# Patient Record
Sex: MALE | Race: WHITE | NOT HISPANIC OR LATINO | Employment: FULL TIME | URBAN - METROPOLITAN AREA
[De-identification: names, ages, dates, MRNs, and addresses within clinical notes are randomized per-mention and may not be internally consistent; named-entity substitution may affect disease eponyms.]

---

## 2023-06-08 ENCOUNTER — OFFICE VISIT (OUTPATIENT)
Dept: PRIMARY CARE | Facility: CLINIC | Age: 28
End: 2023-06-08
Payer: COMMERCIAL

## 2023-06-08 VITALS
DIASTOLIC BLOOD PRESSURE: 80 MMHG | WEIGHT: 179 LBS | BODY MASS INDEX: 23.62 KG/M2 | SYSTOLIC BLOOD PRESSURE: 133 MMHG | HEART RATE: 66 BPM

## 2023-06-08 DIAGNOSIS — J01.40 ACUTE NON-RECURRENT PANSINUSITIS: Primary | ICD-10-CM

## 2023-06-08 PROBLEM — M54.9 BACK PAIN: Status: ACTIVE | Noted: 2023-06-08

## 2023-06-08 PROBLEM — M43.10 SPONDYLOLISTHESIS, ACQUIRED: Status: ACTIVE | Noted: 2023-06-08

## 2023-06-08 PROBLEM — R30.0 DYSURIA: Status: RESOLVED | Noted: 2023-06-08 | Resolved: 2023-06-08

## 2023-06-08 PROCEDURE — 1036F TOBACCO NON-USER: CPT | Performed by: NURSE PRACTITIONER

## 2023-06-08 PROCEDURE — 99213 OFFICE O/P EST LOW 20 MIN: CPT | Performed by: NURSE PRACTITIONER

## 2023-06-08 RX ORDER — METHYLPREDNISOLONE 4 MG/1
TABLET ORAL
Qty: 21 TABLET | Refills: 0 | Status: SHIPPED | OUTPATIENT
Start: 2023-06-08 | End: 2023-06-15

## 2023-06-08 RX ORDER — BENZONATATE 200 MG/1
200 CAPSULE ORAL 3 TIMES DAILY PRN
Qty: 42 CAPSULE | Refills: 0 | Status: SHIPPED | OUTPATIENT
Start: 2023-06-08 | End: 2023-07-08

## 2023-06-08 ASSESSMENT — ENCOUNTER SYMPTOMS: COUGH: 1

## 2023-06-08 NOTE — PROGRESS NOTES
Subjective   Patient ID: Gopi Wang is a 27 y.o. male who presents for Cough (Gopi is here today with c/o headache, cough, chest congestion x7days. Pt went to  on 6/3/23 and pt was given Augmentin x7 days which patient didn't even start until 6/6/23. /Pt also taking OTC medications to help with his symptoms. ).    Cough, nasal congestion, headache, and sinus pressure x 1 week - went to  on 6/3 and given augmentin.  Started Augmentin on 6/6  Also been taking dayquil, nyquil, and mucinex and only mild improvement   Denies fever, n/v/d.      Cough  This is a new problem. The current episode started 1 to 4 weeks ago. The problem has been unchanged.        Review of Systems   Respiratory:  Positive for cough.    All other systems reviewed and are negative.      Objective   /80   Pulse 66   Wt 81.2 kg (179 lb)   BMI 23.62 kg/m²     Physical Exam  Vitals reviewed.   Constitutional:       Appearance: He is normal weight.   HENT:      Head: Normocephalic and atraumatic.      Right Ear: Tympanic membrane, ear canal and external ear normal.      Left Ear: Tympanic membrane, ear canal and external ear normal.      Ears:      Comments: Left ear canal red     Nose:      Right Sinus: Maxillary sinus tenderness and frontal sinus tenderness present.      Left Sinus: Maxillary sinus tenderness and frontal sinus tenderness present.      Mouth/Throat:      Mouth: Mucous membranes are moist.      Pharynx: Oropharynx is clear.   Eyes:      Extraocular Movements: Extraocular movements intact.      Conjunctiva/sclera: Conjunctivae normal.      Pupils: Pupils are equal, round, and reactive to light.   Neck:      Vascular: No carotid bruit.   Cardiovascular:      Rate and Rhythm: Normal rate and regular rhythm.      Pulses: Normal pulses.      Heart sounds: Normal heart sounds.   Pulmonary:      Effort: Pulmonary effort is normal.      Breath sounds: Normal breath sounds.   Musculoskeletal:      Cervical back: Normal range of  motion.   Lymphadenopathy:      Cervical: No cervical adenopathy.   Skin:     General: Skin is warm and dry.   Neurological:      General: No focal deficit present.      Mental Status: He is alert and oriented to person, place, and time.   Psychiatric:         Mood and Affect: Mood normal.         Behavior: Behavior normal.         Thought Content: Thought content normal.         Judgment: Judgment normal.         Assessment/Plan   Problem List Items Addressed This Visit          Infectious/Inflammatory    Acute non-recurrent pansinusitis - Primary     - complete augmentin twice a day as prior prescribed  - medrol dose pack  - benzonatate   -  Nasal saline, increase fluids  -  hand hygiene and infection prevention discussed  -  Warm compress to sinuses  - humidification  - recommend to eat yogurt twice daily while taking antibiotic or a daily probiotic         Relevant Medications    benzonatate (Tessalon) 200 mg capsule    methylPREDNISolone (Medrol Dospak) 4 mg tablets

## 2023-06-08 NOTE — PATIENT INSTRUCTIONS
Continue the antibiotics until they are all gone  Increase your fluids  Take the steroids to help with your ear  I sent in cough medication to help as well

## 2023-06-08 NOTE — ASSESSMENT & PLAN NOTE
- complete augmentin twice a day as prior prescribed  - medrol dose pack  - benzonatate   -  Nasal saline, increase fluids  -  hand hygiene and infection prevention discussed  -  Warm compress to sinuses  - humidification  - recommend to eat yogurt twice daily while taking antibiotic or a daily probiotic

## 2023-10-25 ENCOUNTER — APPOINTMENT (OUTPATIENT)
Dept: PRIMARY CARE | Facility: CLINIC | Age: 28
End: 2023-10-25
Payer: COMMERCIAL

## 2023-12-04 ENCOUNTER — APPOINTMENT (OUTPATIENT)
Dept: PRIMARY CARE | Facility: CLINIC | Age: 28
End: 2023-12-04
Payer: COMMERCIAL

## 2023-12-13 ENCOUNTER — OFFICE VISIT (OUTPATIENT)
Dept: PRIMARY CARE | Facility: CLINIC | Age: 28
End: 2023-12-13
Payer: COMMERCIAL

## 2023-12-13 VITALS
DIASTOLIC BLOOD PRESSURE: 75 MMHG | SYSTOLIC BLOOD PRESSURE: 144 MMHG | OXYGEN SATURATION: 98 % | BODY MASS INDEX: 23.14 KG/M2 | HEART RATE: 58 BPM | HEIGHT: 73 IN | WEIGHT: 174.6 LBS

## 2023-12-13 DIAGNOSIS — Z13.220 LIPID SCREENING: ICD-10-CM

## 2023-12-13 DIAGNOSIS — I15.9 SECONDARY HYPERTENSION: ICD-10-CM

## 2023-12-13 DIAGNOSIS — R19.5 LOOSE STOOLS: ICD-10-CM

## 2023-12-13 DIAGNOSIS — B97.7 HPV IN MALE: ICD-10-CM

## 2023-12-13 PROBLEM — J01.40 ACUTE NON-RECURRENT PANSINUSITIS: Status: RESOLVED | Noted: 2023-06-08 | Resolved: 2023-12-13

## 2023-12-13 PROCEDURE — 99395 PREV VISIT EST AGE 18-39: CPT | Performed by: NURSE PRACTITIONER

## 2023-12-13 PROCEDURE — 1036F TOBACCO NON-USER: CPT | Performed by: NURSE PRACTITIONER

## 2023-12-13 PROCEDURE — 3077F SYST BP >= 140 MM HG: CPT | Performed by: NURSE PRACTITIONER

## 2023-12-13 PROCEDURE — 3078F DIAST BP <80 MM HG: CPT | Performed by: NURSE PRACTITIONER

## 2023-12-13 ASSESSMENT — PATIENT HEALTH QUESTIONNAIRE - PHQ9
1. LITTLE INTEREST OR PLEASURE IN DOING THINGS: NOT AT ALL
SUM OF ALL RESPONSES TO PHQ9 QUESTIONS 1 AND 2: 0
2. FEELING DOWN, DEPRESSED OR HOPELESS: NOT AT ALL

## 2023-12-13 NOTE — PROGRESS NOTES
"Subjective   Patient ID: Gopi Wang is a 28 y.o. male who presents for Annual Exam (Patient is here today for his annual exam, no complaints ).    28-year-old male here for annual.  There are a couple things he wanted to discuss today  HPV was DX this year. Saw derm.  He is concerned for cancer related to HPV.  He understands there is no monitoring for this.  He admits that it makes him nervous.  Open discussion with his wife they have been  8 years with 1 child who is 1-year-old.  GI- recent change in stools- on and off change in consistency.  Occasionally will have loose stools.  The change in bowels has been going on for at least 1 year  Fam hx Breast ca- has  LT discomfort with small lumps LT breast tissue, asking if I will assess  No nicotene; no vape  ETOH- 1-2 a night  Vision q yr  Dentist q 6 mo  No issue with hearing  Works full-time high stress job         Review of Systems   All other systems reviewed and are negative.      Objective   /75   Pulse 58   Ht 1.854 m (6' 1\")   Wt 79.2 kg (174 lb 9.6 oz)   SpO2 98%   BMI 23.04 kg/m²     Physical Exam  Vitals and nursing note reviewed.   Constitutional:       General: He is not in acute distress.     Appearance: Normal appearance.   HENT:      Head: Normocephalic and atraumatic.      Right Ear: Tympanic membrane and ear canal normal.      Left Ear: Tympanic membrane and ear canal normal.      Ears:      Comments: Minimal amount of cerumen bilateral ear canals     Nose: Nose normal.      Mouth/Throat:      Mouth: Mucous membranes are moist.      Pharynx: Oropharynx is clear.      Comments: No oral ulcers  Eyes:      Extraocular Movements: Extraocular movements intact.      Conjunctiva/sclera: Conjunctivae normal.      Pupils: Pupils are equal, round, and reactive to light.   Neck:      Vascular: No carotid bruit.      Comments: Thyroid palpable soft nontender no nodules and no cervical lymphadenopathy  Cardiovascular:      Rate and Rhythm: " "Normal rate and regular rhythm.      Pulses: Normal pulses.      Heart sounds: Normal heart sounds.   Pulmonary:      Effort: Pulmonary effort is normal.      Breath sounds: Normal breath sounds.   Abdominal:      General: Bowel sounds are normal. There is no distension.      Palpations: Abdomen is soft.      Tenderness: There is no abdominal tenderness.   Musculoskeletal:         General: Normal range of motion.      Cervical back: Normal range of motion.      Right lower leg: No edema.      Left lower leg: No edema.      Comments: Breast exam complete no nodules palpated no lymph nodes.  I cannot feel any of the \"lumps \"left breast that the patient was concerned with.  I palpated it between every rib cage including the axilla and breast tissue   Lymphadenopathy:      Cervical: No cervical adenopathy.   Skin:     General: Skin is warm and dry.      Capillary Refill: Capillary refill takes less than 2 seconds.   Neurological:      General: No focal deficit present.      Mental Status: He is alert and oriented to person, place, and time. Mental status is at baseline.   Psychiatric:         Mood and Affect: Mood normal.         Behavior: Behavior normal.         Thought Content: Thought content normal.         Judgment: Judgment normal.         Assessment/Plan   Diagnoses and all orders for this visit:  Loose stools  Comments:  Unknown cause could be dietary could be stress related.  Check TSH and LFT  Orders:  -     Thyroid Stimulating Hormone; Future  -     Thyroxine, Free; Future  HPV in male  Comments:  Needs to make sure to see the dentist every year monitor for new lesions update CBC with differential  Orders:  -     CBC and Auto Differential; Future  Lipid screening  Comments:  Update fasting lipid panel  Orders:  -     Lipid Panel; Future  Secondary hypertension  Comments:  Continue to monitor may be stress related.  Increase activity and exercise decrease alcohol.  Healthy low-fat low-sodium diet  Orders:  -  "    Comprehensive Metabolic Panel; Future  Other orders  -     Follow Up In Primary Care - Other; Future

## 2023-12-13 NOTE — PATIENT INSTRUCTIONS
Add Magnesium Glycinate - [facility] 1 tab am and pm  Vitamin D 3- 5,000 units a day with food.

## 2023-12-14 ENCOUNTER — LAB (OUTPATIENT)
Dept: LAB | Facility: LAB | Age: 28
End: 2023-12-14
Payer: COMMERCIAL

## 2023-12-14 DIAGNOSIS — I15.9 SECONDARY HYPERTENSION: ICD-10-CM

## 2023-12-14 DIAGNOSIS — B97.7 HPV IN MALE: ICD-10-CM

## 2023-12-14 DIAGNOSIS — Z13.220 LIPID SCREENING: ICD-10-CM

## 2023-12-14 DIAGNOSIS — R19.5 LOOSE STOOLS: ICD-10-CM

## 2023-12-14 LAB
ALBUMIN SERPL BCP-MCNC: 4.5 G/DL (ref 3.4–5)
ALP SERPL-CCNC: 38 U/L (ref 33–120)
ALT SERPL W P-5'-P-CCNC: 20 U/L (ref 10–52)
ANION GAP SERPL CALC-SCNC: 9 MMOL/L (ref 10–20)
AST SERPL W P-5'-P-CCNC: 16 U/L (ref 9–39)
BASOPHILS # BLD AUTO: 0.06 X10*3/UL (ref 0–0.1)
BASOPHILS NFR BLD AUTO: 1 %
BILIRUB SERPL-MCNC: 0.6 MG/DL (ref 0–1.2)
BUN SERPL-MCNC: 12 MG/DL (ref 6–23)
CALCIUM SERPL-MCNC: 9.6 MG/DL (ref 8.6–10.6)
CHLORIDE SERPL-SCNC: 103 MMOL/L (ref 98–107)
CHOLEST SERPL-MCNC: 136 MG/DL (ref 0–199)
CHOLESTEROL/HDL RATIO: 2.7
CO2 SERPL-SCNC: 33 MMOL/L (ref 21–32)
CREAT SERPL-MCNC: 1.12 MG/DL (ref 0.5–1.3)
EOSINOPHIL # BLD AUTO: 0.18 X10*3/UL (ref 0–0.7)
EOSINOPHIL NFR BLD AUTO: 3 %
ERYTHROCYTE [DISTWIDTH] IN BLOOD BY AUTOMATED COUNT: 11.9 % (ref 11.5–14.5)
GFR SERPL CREATININE-BSD FRML MDRD: >90 ML/MIN/1.73M*2
GLUCOSE SERPL-MCNC: 77 MG/DL (ref 74–99)
HCT VFR BLD AUTO: 45 % (ref 41–52)
HDLC SERPL-MCNC: 49.5 MG/DL
HGB BLD-MCNC: 15.4 G/DL (ref 13.5–17.5)
IMM GRANULOCYTES # BLD AUTO: 0.02 X10*3/UL (ref 0–0.7)
IMM GRANULOCYTES NFR BLD AUTO: 0.3 % (ref 0–0.9)
LDLC SERPL CALC-MCNC: 71 MG/DL
LYMPHOCYTES # BLD AUTO: 1.38 X10*3/UL (ref 1.2–4.8)
LYMPHOCYTES NFR BLD AUTO: 22.8 %
MCH RBC QN AUTO: 31.2 PG (ref 26–34)
MCHC RBC AUTO-ENTMCNC: 34.2 G/DL (ref 32–36)
MCV RBC AUTO: 91 FL (ref 80–100)
MONOCYTES # BLD AUTO: 0.53 X10*3/UL (ref 0.1–1)
MONOCYTES NFR BLD AUTO: 8.8 %
NEUTROPHILS # BLD AUTO: 3.88 X10*3/UL (ref 1.2–7.7)
NEUTROPHILS NFR BLD AUTO: 64.1 %
NON HDL CHOLESTEROL: 87 MG/DL (ref 0–149)
NRBC BLD-RTO: 0 /100 WBCS (ref 0–0)
PLATELET # BLD AUTO: 165 X10*3/UL (ref 150–450)
POTASSIUM SERPL-SCNC: 4.2 MMOL/L (ref 3.5–5.3)
PROT SERPL-MCNC: 7 G/DL (ref 6.4–8.2)
RBC # BLD AUTO: 4.94 X10*6/UL (ref 4.5–5.9)
SODIUM SERPL-SCNC: 141 MMOL/L (ref 136–145)
T4 FREE SERPL-MCNC: 0.9 NG/DL (ref 0.78–1.48)
TRIGL SERPL-MCNC: 80 MG/DL (ref 0–149)
TSH SERPL-ACNC: 0.94 MIU/L (ref 0.44–3.98)
VLDL: 16 MG/DL (ref 0–40)
WBC # BLD AUTO: 6.1 X10*3/UL (ref 4.4–11.3)

## 2023-12-14 PROCEDURE — 85025 COMPLETE CBC W/AUTO DIFF WBC: CPT | Performed by: NURSE PRACTITIONER

## 2023-12-14 PROCEDURE — 84439 ASSAY OF FREE THYROXINE: CPT | Performed by: NURSE PRACTITIONER

## 2023-12-14 PROCEDURE — 84443 ASSAY THYROID STIM HORMONE: CPT | Performed by: NURSE PRACTITIONER

## 2023-12-14 PROCEDURE — 80053 COMPREHEN METABOLIC PANEL: CPT | Performed by: NURSE PRACTITIONER

## 2023-12-14 PROCEDURE — 80061 LIPID PANEL: CPT | Performed by: NURSE PRACTITIONER

## 2024-04-15 ENCOUNTER — OFFICE VISIT (OUTPATIENT)
Dept: UROLOGY | Facility: HOSPITAL | Age: 29
End: 2024-04-15
Payer: COMMERCIAL

## 2024-04-15 ENCOUNTER — LAB (OUTPATIENT)
Dept: LAB | Facility: LAB | Age: 29
End: 2024-04-15
Payer: COMMERCIAL

## 2024-04-15 DIAGNOSIS — R39.15 URINARY URGENCY: ICD-10-CM

## 2024-04-15 DIAGNOSIS — R35.0 URINARY FREQUENCY: ICD-10-CM

## 2024-04-15 DIAGNOSIS — R35.0 URINARY FREQUENCY: Primary | ICD-10-CM

## 2024-04-15 LAB
CREAT SERPL-MCNC: 1.01 MG/DL (ref 0.5–1.3)
EGFRCR SERPLBLD CKD-EPI 2021: >90 ML/MIN/1.73M*2
PSA SERPL-MCNC: 3.23 NG/ML

## 2024-04-15 PROCEDURE — 84153 ASSAY OF PSA TOTAL: CPT

## 2024-04-15 PROCEDURE — 36415 COLL VENOUS BLD VENIPUNCTURE: CPT

## 2024-04-15 PROCEDURE — 99214 OFFICE O/P EST MOD 30 MIN: CPT | Performed by: UROLOGY

## 2024-04-15 PROCEDURE — 99204 OFFICE O/P NEW MOD 45 MIN: CPT | Performed by: UROLOGY

## 2024-04-15 PROCEDURE — 82565 ASSAY OF CREATININE: CPT

## 2024-04-15 NOTE — PROGRESS NOTES
NPV self referred:  -Increased urinary urgency  -dysuria dx from [facility]  -no new meds  -no time difference, happening all the time  -no psa level on file (thinks dad has prostate hx)  - X10 years, hx HPV dx within last 1 year

## 2024-04-15 NOTE — PROGRESS NOTES
HPI  28 y.o.  M presents today for complaints of LUTS.    -reports increased urinary urgency/ frequency - high volume voids each michelle  -drinks mostly water, some alcoholic beverages  -denies gross hematuria  -dysuria dx from [facility]  -no new meds  -no psa level on file (thinks dad has prostate hx)  - X10 years, hx HPV dx within last 1 year  -denies hx of pelvic trauma, gross hematuria, hx of STDs     Component      Latest Ref Rn 12/14/2023   LEUKOCYTES (10*3/UL) IN BLOOD BY AUTOMATED COUNT, [facility]      4.4 - 11.3 x10*3/uL 6.1    nRBC      0.0 - 0.0 /100 WBCs 0.0    ERYTHROCYTES (10*6/UL) IN BLOOD BY AUTOMATED COUNT, [facility]      4.50 - 5.90 x10*6/uL 4.94    HEMOGLOBIN      13.5 - 17.5 g/dL 15.4    HEMATOCRIT      41.0 - 52.0 % 45.0    MCV      80 - 100 fL 91    MCH      26.0 - 34.0 pg 31.2    MCHC      32.0 - 36.0 g/dL 34.2    RED CELL DISTRIBUTION WIDTH      11.5 - 14.5 % 11.9    PLATELETS (10*3/UL) IN BLOOD AUTOMATED COUNT, [facility]      150 - 450 x10*3/uL 165    NEUTROPHILS/100 LEUKOCYTES IN BLOOD BY AUTOMATED COUNT, [facility]      40.0 - 80.0 % 64.1    Immature Granulocytes %, Automated      0.0 - 0.9 % 0.3    Lymphocytes %      13.0 - 44.0 % 22.8    Monocytes %      2.0 - 10.0 % 8.8    Eosinophils %      0.0 - 6.0 % 3.0    Basophils %      0.0 - 2.0 % 1.0    NEUTROPHILS (10*3/UL) IN BLOOD BY AUTOMATED COUNT, [facility]      1.20 - 7.70 x10*3/uL 3.88    Immature Granulocytes Absolute, Automated      0.00 - 0.70 x10*3/uL 0.02    Lymphocytes Absolute      1.20 - 4.80 x10*3/uL 1.38    Monocytes Absolute      0.10 - 1.00 x10*3/uL 0.53    Eosinophils Absolute      0.00 - 0.70 x10*3/uL 0.18    Basophils Absolute      0.00 - 0.10 x10*3/uL 0.06    GLUCOSE      74 - 99 mg/dL 77    SODIUM      136 - 145 mmol/L 141    POTASSIUM      3.5 - 5.3 mmol/L 4.2    CHLORIDE      98 - 107 mmol/L 103    Bicarbonate      21 - 32 mmol/L 33 (H)    Anion Gap      10 - 20 mmol/L 9 (L)    Blood Urea Nitrogen      6 - 23 mg/dL 12     Creatinine      0.50 - 1.30 mg/dL 1.12    EGFR      >60 mL/min/1.73m*2 >90    Calcium      8.6 - 10.6 mg/dL 9.6    Albumin      3.4 - 5.0 g/dL 4.5    Alkaline Phosphatase      33 - 120 U/L 38    Total Protein      6.4 - 8.2 g/dL 7.0    AST      9 - 39 U/L 16    Bilirubin Total      0.0 - 1.2 mg/dL 0.6    ALT      10 - 52 U/L 20        Current Medications:  No current outpatient medications on file.     No current facility-administered medications for this visit.        PMH:  Past Medical History:   Diagnosis Date    Dysuria 06/08/2023    Personal history of (healed) stress fracture     Personal history of stress fracture    Spondylolisthesis, site unspecified     Spondylolisthesis       PSH:  Past Surgical History:   Procedure Laterality Date    OTHER SURGICAL HISTORY  07/17/2013    Dental Surgery    TONSILLECTOMY  07/17/2013    Tonsillectomy       FMH:  No family history on file.    SHx:  Social History     Tobacco Use    Smoking status: Never     Passive exposure: Never    Smokeless tobacco: Never   Vaping Use    Vaping status: Never Used   Substance Use Topics    Alcohol use: Yes     Comment: social    Drug use: Never       Allergies:  Allergies   Allergen Reactions    Sulfa (Sulfonamide Antibiotics) Unknown       Physical Exam   Testicles descended bilaterally, non-tender  Grade 2 varicoceles, bilaterally  Vasa palpable bilaterally  Penis circ'd, no lesions, no plaques    Assessment/Plan  #LUTS   We discussed the different causes for urinary difficulties as patients age, specifically BPH, bladder overactivity, and even stricture disease. We discussed different medications that could help him with his symptoms, including alpha blockers and finasteride, as well as surgical options such as TURP and Urolift.   -discussed dietary changes including restricting beverages with alcohol, caffeine and/or carbonation  -urinalysis/culture/PVR today   -Cr  -PSA  -cystoscopy in office. Discussed risks, including discomfort  bleeding, infection, damage to adjacent structures, need for further procedures, recurrence, etc     Follow up for cysto    Scribe Attestation  By signing my name below, I, Kacy Canseco, attest that this documentation  has been prepared under the direction and in the presence of Margarita Min MD.

## 2024-06-03 ENCOUNTER — PROCEDURE VISIT (OUTPATIENT)
Dept: UROLOGY | Facility: HOSPITAL | Age: 29
End: 2024-06-03
Payer: COMMERCIAL

## 2024-06-03 DIAGNOSIS — R39.9 LOWER URINARY TRACT SYMPTOMS (LUTS): ICD-10-CM

## 2024-06-03 DIAGNOSIS — R30.0 DYSURIA: ICD-10-CM

## 2024-06-03 DIAGNOSIS — R35.0 URINARY FREQUENCY: Primary | ICD-10-CM

## 2024-06-03 DIAGNOSIS — Z09 ENCOUNTER FOR FOLLOW-UP: ICD-10-CM

## 2024-06-03 DIAGNOSIS — R97.20 ELEVATED PSA: ICD-10-CM

## 2024-06-03 DIAGNOSIS — R39.15 URINARY URGENCY: ICD-10-CM

## 2024-06-03 PROCEDURE — 99213 OFFICE O/P EST LOW 20 MIN: CPT | Performed by: UROLOGY

## 2024-06-03 PROCEDURE — 87086 URINE CULTURE/COLONY COUNT: CPT | Mod: AHULAB | Performed by: UROLOGY

## 2024-06-03 PROCEDURE — 52000 CYSTOURETHROSCOPY: CPT | Performed by: UROLOGY

## 2024-06-03 NOTE — PROGRESS NOTES
HPI  28 y.o.  M presents today for complaints of LUTS.    Last Visit 4/15/24:  -discussed dietary changes including restricting beverages with alcohol, caffeine and/or carbonation  -urinalysis/culture/PVR today   -Cr  -PSA  -cystoscopy in office    Todays Visit:  #LUTS  -UA/CX not completed at last visit  - dip negative today  -had cysto today (see separate note)  -no hematuria  -reviewed PSA  -      -reports increased urinary urgency/ frequency - high volume voids each michelle  -drinks mostly water, some alcoholic beverages  -denies gross hematuria  -dysuria dx from [facility]  -no new meds  -no psa level on file (thinks dad has prostate hx)  - X10 years, hx HPV dx within last 1 year  -denies hx of pelvic trauma, gross hematuria, hx of STDs      Creatinine   Date Value Ref Range Status   04/15/2024 1.01 0.50 - 1.30 mg/dL Final     Prostate Specific AG   Date Value Ref Range Status   04/15/2024 3.23 <=4.00 ng/mL Final        Current Medications:  No current outpatient medications on file.     No current facility-administered medications for this visit.        PMH:  Past Medical History:   Diagnosis Date    Dysuria 06/08/2023    Personal history of (healed) stress fracture     Personal history of stress fracture    Spondylolisthesis, site unspecified     Spondylolisthesis       PSH:  Past Surgical History:   Procedure Laterality Date    OTHER SURGICAL HISTORY  07/17/2013    Dental Surgery    TONSILLECTOMY  07/17/2013    Tonsillectomy       FMH:  No family history on file.    SHx:  Social History     Tobacco Use    Smoking status: Never     Passive exposure: Never    Smokeless tobacco: Never   Vaping Use    Vaping status: Never Used   Substance Use Topics    Alcohol use: Yes     Comment: social    Drug use: Never       Allergies:  Allergies   Allergen Reactions    Sulfa (Sulfonamide Antibiotics) Unknown       Physical Exam   Testicles descended bilaterally, non-tender  Grade 2 varicoceles, bilaterally  Vasa  palpable bilaterally  Penis circ'd, no lesions, no plaques    Assessment/Plan  #LUTS: cysto wnl today  -discussed dietary changes including restricting beverages with alcohol, caffeine and/or carbonation  -urinalysis/culture    #elevated psa: 3, high for his age; discussed risk of prostate cancer. Even  -psa  -mri prostate    Fu with my oncology partner    Scribe Attestation  By signing my name below, I, Kacy Canseco, attest that this documentation  has been prepared under the direction and in the presence of Margarita Min MD.

## 2024-06-03 NOTE — PROGRESS NOTES
Patient ID: Gopi Wang is a 28 y.o. male.    Procedures  Procedure:  After informed consent was obtained, the patient was taken to the procedure room for cystoscopy due to LUTS.     Cystoscopy     Procedure Note:    A sterile prep and drape was performed in standard fashion. Lidocaine was used for topical anesthesia. A flexible cystoscope was inserted into the urethra without difficulty revealing normal urethra.     The prostate was wnl    Then entered the bladder revealing bladder mucosa with no erythematous patches or plaques, foreign bodies, stones or papillary lesions. The ureteral orifices were visualized bilaterally. These were orthotopic in location and effluxing clear urine. No masses were seen on retroflexion.     Post-Procedure:   The cystoscope was removed. The vital signs were stable . The patient tolerated the procedure well. There were no complications.     Scribe Attestation  By signing my name below, I, Kacy Canseco, attest that this documentation  has been prepared under the direction and in the presence of Margarita Min MD.

## 2024-06-04 LAB — BACTERIA UR CULT: NO GROWTH

## 2024-06-07 ENCOUNTER — LAB (OUTPATIENT)
Dept: LAB | Facility: LAB | Age: 29
End: 2024-06-07
Payer: COMMERCIAL

## 2024-06-07 DIAGNOSIS — R35.0 URINARY FREQUENCY: ICD-10-CM

## 2024-06-07 DIAGNOSIS — R39.15 URINARY URGENCY: ICD-10-CM

## 2024-06-07 DIAGNOSIS — Z09 ENCOUNTER FOR FOLLOW-UP: ICD-10-CM

## 2024-06-07 DIAGNOSIS — R30.0 DYSURIA: ICD-10-CM

## 2024-06-07 DIAGNOSIS — R39.9 LOWER URINARY TRACT SYMPTOMS (LUTS): ICD-10-CM

## 2024-06-07 LAB — PSA SERPL-MCNC: 3.03 NG/ML

## 2024-06-07 PROCEDURE — 84153 ASSAY OF PSA TOTAL: CPT

## 2024-06-07 PROCEDURE — 87086 URINE CULTURE/COLONY COUNT: CPT

## 2024-06-07 PROCEDURE — 36415 COLL VENOUS BLD VENIPUNCTURE: CPT

## 2024-06-08 LAB — BACTERIA UR CULT: NO GROWTH

## 2024-06-28 ENCOUNTER — HOSPITAL ENCOUNTER (OUTPATIENT)
Dept: RADIOLOGY | Facility: HOSPITAL | Age: 29
Discharge: HOME | End: 2024-06-28
Payer: COMMERCIAL

## 2024-06-28 DIAGNOSIS — R35.0 URINARY FREQUENCY: ICD-10-CM

## 2024-06-28 DIAGNOSIS — R39.9 LOWER URINARY TRACT SYMPTOMS (LUTS): ICD-10-CM

## 2024-06-28 DIAGNOSIS — R39.15 URINARY URGENCY: ICD-10-CM

## 2024-06-28 DIAGNOSIS — Z09 ENCOUNTER FOR FOLLOW-UP: ICD-10-CM

## 2024-06-28 DIAGNOSIS — R30.0 DYSURIA: ICD-10-CM

## 2024-06-28 PROCEDURE — A9575 INJ GADOTERATE MEGLUMI 0.1ML: HCPCS | Performed by: UROLOGY

## 2024-06-28 PROCEDURE — 2500000004 HC RX 250 GENERAL PHARMACY W/ HCPCS (ALT 636 FOR OP/ED): Performed by: UROLOGY

## 2024-06-28 PROCEDURE — 72197 MRI PELVIS W/O & W/DYE: CPT

## 2024-06-28 RX ORDER — GADOTERATE MEGLUMINE 376.9 MG/ML
0.2 INJECTION INTRAVENOUS
Status: COMPLETED | OUTPATIENT
Start: 2024-06-28 | End: 2024-06-28

## 2024-07-15 ENCOUNTER — OFFICE VISIT (OUTPATIENT)
Dept: UROLOGY | Facility: HOSPITAL | Age: 29
End: 2024-07-15
Payer: COMMERCIAL

## 2024-07-15 DIAGNOSIS — R39.9 LOWER URINARY TRACT SYMPTOMS (LUTS): ICD-10-CM

## 2024-07-15 DIAGNOSIS — Z12.5 PROSTATE CANCER SCREENING: ICD-10-CM

## 2024-07-15 DIAGNOSIS — Z11.3 SCREENING EXAMINATION FOR STD (SEXUALLY TRANSMITTED DISEASE): ICD-10-CM

## 2024-07-15 DIAGNOSIS — Z31.41 FERTILITY TESTING: Primary | ICD-10-CM

## 2024-07-15 LAB
POC APPEARANCE, URINE: CLEAR
POC BILIRUBIN, URINE: NEGATIVE
POC BLOOD, URINE: NEGATIVE
POC COLOR, URINE: YELLOW
POC GLUCOSE, URINE: NEGATIVE MG/DL
POC KETONES, URINE: NEGATIVE MG/DL
POC LEUKOCYTES, URINE: NEGATIVE
POC NITRITE,URINE: NEGATIVE
POC PH, URINE: 6.5 PH
POC PROTEIN, URINE: NEGATIVE MG/DL
POC SPECIFIC GRAVITY, URINE: 1.01
POC UROBILINOGEN, URINE: 0.2 EU/DL

## 2024-07-15 PROCEDURE — 99214 OFFICE O/P EST MOD 30 MIN: CPT | Performed by: STUDENT IN AN ORGANIZED HEALTH CARE EDUCATION/TRAINING PROGRAM

## 2024-07-15 PROCEDURE — 87491 CHLMYD TRACH DNA AMP PROBE: CPT | Performed by: STUDENT IN AN ORGANIZED HEALTH CARE EDUCATION/TRAINING PROGRAM

## 2024-07-15 PROCEDURE — 81003 URINALYSIS AUTO W/O SCOPE: CPT | Performed by: STUDENT IN AN ORGANIZED HEALTH CARE EDUCATION/TRAINING PROGRAM

## 2024-07-15 NOTE — PROGRESS NOTES
UROLOGIC INITIAL EVALUATION       PROBLEM LIST:  1. Prostate cancer screening             HISTORY OF PRESENT ILLNESS:   Gopi Wang is a 28 y.o. male who was referred by Dr. Min for PSA management. PSA 6/7/2024 3.03, 4/15/2023 3.23. MR prostate completed 6/28/2024 revealed Diffuse non nodular hypointensities within the peripheral zone, without evidence of focally restricted diffusion ( PI-RADS 2). Findings may relate to changes of prostatitis in the appropriate clinical setting.Denies family history of cancer. He does have complaints of dysuria that has been ongoing for the last 2 years. Rates dysuria 4/10. He denies any hematuria or other lower urinary tract symptoms.     PAST MEDICAL HISTORY:  Past Medical History:   Diagnosis Date    Dysuria 06/08/2023    Personal history of (healed) stress fracture     Personal history of stress fracture    Spondylolisthesis, site unspecified     Spondylolisthesis       PAST SURGICAL HISTORY:  Past Surgical History:   Procedure Laterality Date    OTHER SURGICAL HISTORY  07/17/2013    Dental Surgery    TONSILLECTOMY  07/17/2013    Tonsillectomy        ALLERGIES:   Allergies   Allergen Reactions    Sulfa (Sulfonamide Antibiotics) Unknown        MEDICATIONS:   No current outpatient medications on file.      SOCIAL HISTORY:  Patient  reports that he has never smoked. He has never been exposed to tobacco smoke. He has never used smokeless tobacco. He reports current alcohol use. He reports that he does not use drugs.   Social History     Socioeconomic History    Marital status:      Spouse name: Not on file    Number of children: Not on file    Years of education: Not on file    Highest education level: Not on file   Occupational History    Not on file   Tobacco Use    Smoking status: Never     Passive exposure: Never    Smokeless tobacco: Never   Vaping Use    Vaping status: Never Used   Substance and Sexual Activity    Alcohol use: Yes     Comment: social     Drug use: Never    Sexual activity: Not on file   Other Topics Concern    Not on file   Social History Narrative    Not on file     Social Determinants of Health     Financial Resource Strain: Not on file   Food Insecurity: Not on file   Transportation Needs: Not on file   Physical Activity: Not on file   Stress: Not on file   Social Connections: Not on file   Intimate Partner Violence: Not on file   Housing Stability: Not on file       FAMILY HISTORY:  No family history on file.    REVIEW OF SYSTEMS:   Constitutional: Negative for fever and chills. Denies anorexia, weight loss.  Eyes: Negative for visual disturbance.   Respiratory: Negative for shortness of breath.    Cardiovascular: Negative for chest pain.   Gastrointestinal: Negative for nausea and vomiting.   Genitourinary: See interval history above.  Skin: Negative for rash.   Neurological: Negative for dizziness and numbness.   Psychiatric/Behavioral: Negative for confusion and decreased concentration.     PHYSICAL EXAM:  There were no vitals taken for this visit.  Constitutional: Patient appears well-developed and well-nourished. No distress.    Head: Normocephalic and atraumatic.    Neck: Normal range of motion.    Cardiovascular: Normal rate.    Pulmonary/Chest: Effort normal. No respiratory distress.   Abdominal: Soft nontender nondistended  Musculoskeletal: Normal range of motion.    Neurological: Alert and oriented to person, place, and time.  Psychiatric: Normal mood and affect. Behavior is normal. Thought content normal.        LABORATORY REVIEW:     Lab Results   Component Value Date    BUN 12 12/14/2023    CREATININE 1.01 04/15/2024    EGFR >90 04/15/2024     12/14/2023    K 4.2 12/14/2023     12/14/2023    CO2 33 (H) 12/14/2023    CALCIUM 9.6 12/14/2023      Lab Results   Component Value Date    WBC 6.1 12/14/2023    RBC 4.94 12/14/2023    HGB 15.4 12/14/2023    HCT 45.0 12/14/2023    MCV 91 12/14/2023    MCH 31.2 12/14/2023    MCHC  34.2 12/14/2023    RDW 11.9 12/14/2023     12/14/2023    MPV 12.5 12/14/2018        Lab Results   Component Value Date    PSA 3.03 06/07/2024    PSA 3.23 04/15/2024         Assessment:      1. Prostate cancer screening             Plan:   Reviewed and interpreted patient's PSA trend  Counseled that because his PSA is elevated for 28-year-old male I would recommend closer surveillance  Will see patient back in 6 months to recheck his PSA  Will also perform infectious workup to rule out any infectious etiology of his elevated PSA    30 minutes total spent on patient's care today; >50% time spent on counseling/coordination of care

## 2024-07-16 ENCOUNTER — LAB (OUTPATIENT)
Dept: LAB | Facility: LAB | Age: 29
End: 2024-07-16
Payer: COMMERCIAL

## 2024-07-16 DIAGNOSIS — Z11.3 SCREENING EXAMINATION FOR STD (SEXUALLY TRANSMITTED DISEASE): ICD-10-CM

## 2024-07-16 DIAGNOSIS — Z12.5 PROSTATE CANCER SCREENING: ICD-10-CM

## 2024-07-16 LAB
C TRACH RRNA SPEC QL NAA+PROBE: NEGATIVE
HBV SURFACE AB SER-ACNC: 17.6 MIU/ML
HBV SURFACE AG SERPL QL IA: NONREACTIVE
HCV AB SER QL: NONREACTIVE
HIV 1+2 AB+HIV1 P24 AG SERPL QL IA: NONREACTIVE
N GONORRHOEA DNA SPEC QL PROBE+SIG AMP: NEGATIVE
PSA SERPL-MCNC: 2.99 NG/ML
TREPONEMA PALLIDUM IGG+IGM AB [PRESENCE] IN SERUM OR PLASMA BY IMMUNOASSAY: NONREACTIVE

## 2024-07-16 PROCEDURE — 87389 HIV-1 AG W/HIV-1&-2 AB AG IA: CPT

## 2024-07-16 PROCEDURE — 86780 TREPONEMA PALLIDUM: CPT

## 2024-07-16 PROCEDURE — 84153 ASSAY OF PSA TOTAL: CPT

## 2024-07-16 PROCEDURE — 87340 HEPATITIS B SURFACE AG IA: CPT

## 2024-07-16 PROCEDURE — 86706 HEP B SURFACE ANTIBODY: CPT

## 2024-07-16 PROCEDURE — 36415 COLL VENOUS BLD VENIPUNCTURE: CPT

## 2024-07-16 PROCEDURE — 86803 HEPATITIS C AB TEST: CPT

## 2024-08-16 ENCOUNTER — APPOINTMENT (OUTPATIENT)
Dept: ENDOCRINOLOGY | Facility: CLINIC | Age: 29
End: 2024-08-16
Payer: COMMERCIAL

## 2024-08-16 DIAGNOSIS — Z31.41 FERTILITY TESTING: ICD-10-CM

## 2024-08-16 LAB
% EX RESIDUAL CYTOPLASM (SEMEN): 0.5 %
% HEAD DEFECTS (SEMEN): 95.8 %
% NECK MIDPIECE (SEMEN): 41.3 %
% NORMAL (SEMEN): 4 % (ref 4–?)
% TAIL DEFECTS (SEMEN): 12.3 %
ABSTINENCE (DAYS): 6 DAYS (ref 2–7)
AGGLUTINATION (SEMEN): NO
ANALYZED TIME:: NORMAL
ANDROLOGY LAB ID#: NORMAL
CLUMPS (SEMEN): YES
COLLECTED COMPLETELY: YES
COLLECTION LOCATION:: NORMAL
COLLECTION METHOD:: NORMAL
CONCENTRATION(SEMEN): 53.33 MILL/ML (ref 15–?)
DEBRIS (SEMEN): YES
NON PROG. MOTILITY (SEMEN): 4 %
PROG. MOTILITY (SEMEN): 65 % (ref 32–?)
RECEIVED TIME:: NORMAL
SEMEN APPEARANCE: NORMAL
SEMEN LIQUEFACTION: NORMAL
SEMEN VISCOSITY: NORMAL
TOT. NO OF NORM. MOTILE SPERM (SEMEN): 8.95 MILL
TOT. NO OF NORM. SPERM (SEMEN): 13.01 MILL
TOTAL MOTILITY (SEMEN): 69 % (ref 40–?)
TOTAL NO OF MOTILE (SEMEN): 223.67 MILL
TOTAL NO OF SPERM (SEMEN): 325.33 MILL (ref 39–?)
VOLUME (SEMEN): 6.1 ML (ref 1.5–?)

## 2024-08-16 PROCEDURE — 89322 SEMEN ANAL STRICT CRITERIA: CPT | Performed by: OBSTETRICS & GYNECOLOGY

## 2024-08-26 ENCOUNTER — OFFICE VISIT (OUTPATIENT)
Dept: UROLOGY | Facility: HOSPITAL | Age: 29
End: 2024-08-26
Payer: COMMERCIAL

## 2024-08-26 DIAGNOSIS — Z12.5 PROSTATE CANCER SCREENING: ICD-10-CM

## 2024-08-26 DIAGNOSIS — I86.1 VARICOCELE: Primary | ICD-10-CM

## 2024-08-26 PROCEDURE — 99214 OFFICE O/P EST MOD 30 MIN: CPT | Performed by: UROLOGY

## 2024-08-26 NOTE — LETTER
2024     Carol Kiser, APRN-CNP  [address] [place], [facility] [address]  [facility] [address]    Patient: Gopi Wang   YOB: 1995   Date of Visit: 2024       Dear Dr. Carol Kiser, APRN-CNP:    Thank you for referring Gopi Wang to me for evaluation. Below are my notes for this consultation.  If you have questions, please do not hesitate to call me. I look forward to following your patient along with you.       Sincerely,     Margarita Min MD      CC: No Recipients  ______________________________________________________________________________________    HPI:  28 y.o. yo male  presenting for infertility  Oncology and PCP notes reviewed.    Partner/wife  : 30 years old  - periods are regular since last birth.  - before first pregnancy her periods were abnormal.  - Seeing gynecology for annuals  Attempting Pregnancy for : 6 months  Previous pregnancies for either partner: One prior pregnancy  - Will be [age] years old in November  - reports grade 2 varicocele    Previous Semen analysis / Labs:     Component      Latest Ref Rng 2024   Andrology Lab ID# [id number]    Semen Appearance Normal    Semen Viscosity Normal    Semen Liquefaction Normal    Debris (Semen) Yes    Clumps (Semen) Yes    Agglutination (Semen) No    Volume (Semen)      1.5 mL 6.1    Concentration(Semen)      15 mill/mL 53.33    Total Motility (Semen)      40 % 69    Prog. Motility (Semen)      32 % 65    Non Prog. Motility (Semen)      % 4    Total No of Sperm (Semen)      39 mill 325.33    Total No of Motile (Semen)      mill 223.67    % Normal (Semen)      4 % 4.0    % Head defects (Semen)      % 95.8    % Neck Midpiece (Semen)      % 41.3    % Tail defects (Semen)      % 12.3    % Ex Residual Cytoplasm (Semen)      % 0.5    Tot. No of Norm. Sperm (Semen)      mill 13.013    Tot. No of Norm. Motile Sperm (Semen)      mill 8.947    Abstinence (days)      2 - 7 days 6     Collected Completely Yes    Collection Location Center    Collection Method Masturbation    Received time 11:19 am    Analyzed Time 11:34 am      PREVIOUS RISK FACTORS  History of testicular exposure to chemicals, radiation, or toxins: None  History of high fever, epididymitis, orchitis, prostatitis, sexually transmitted diseases, or trauma to the testicles: None  History of a varicocele, testicular torsion, cryptorchidism, postpubertile mumps or a family history of infertility: None  Coital Frequency:   Coital Lubricants: yes, water based or coconut oil.  Problems with erections or ejaculation: None  Smoker? No  Previous Testosterone or anabolic steroid Use: none    PRIOR Assisted Reproductive Technology:  None    Family history of breast cancer.  His cousins and close relatives required mastectomies.      Imaging:  MRI prostate, 6/28/24: personally reviewed and interpreted  IMPRESSION:  Diffuse non nodular hypointensities within the peripheral zone,  without evidence of focally restricted diffusion ( PI-RADS 2).  Findings may relate to changes of prostatitis in the appropriate  clinical setting.    Labs:  PSA trend:  7/16/24: 2.99  6/7/24: 3.03  4/15/24: 3.23    PMH:  Past Medical History:   Diagnosis Date   • Dysuria 06/08/2023   • Personal history of (healed) stress fracture     Personal history of stress fracture   • Spondylolisthesis, site unspecified     Spondylolisthesis        PSH:  Past Surgical History:   Procedure Laterality Date   • OTHER SURGICAL HISTORY  07/17/2013    Dental Surgery   • TONSILLECTOMY  07/17/2013    Tonsillectomy        Medications:  No current outpatient medications on file.    Allergy:  Allergies   Allergen Reactions   • Sulfa (Sulfonamide Antibiotics) Unknown        Exam  Testicles descended bilaterally, nontender, no masses  Vasa palpable bilaterally  Penis circ'd, no lesions, no plaques    Assessment/Plan  #infertility  I counseled the patient in detail regarding infertility,  specifically the male component. We reviewed different causes of male infertility as well as options to optimize male fertility, to different surgical interventions and assisted reproductive technologies.    Recommended waiting until 1 year of trying to conceive before definitive diagnosis of infertility  Discussed grade 2 varicocele is not concerning for infertility given SA is normal - discussed r/b/a of varicocele repair, not indciated at this time.  Provided female fertility doctor info.    #prostate ca screening  Discussed FH of breast cancer and implications of BRCA gene (unclear if this is the gene mutation in his family)  Patient will talk to Dr. Arteaga to confirm the type.    Fu as needed with me, will fu with Dr. Arteaga re: prostate ca screening    Scribe Attestation  By signing my name below, I, Kacy Ley,   attest that this documentation has been prepared under the direction and in the presence of Margarita Min MD.

## 2024-08-26 NOTE — PROGRESS NOTES
HPI:  28 y.o. yo male  presenting for infertility  Oncology and PCP notes reviewed.    Partner/wife  : 28 years old  - periods are regular since last birth.  - before first pregnancy her periods were abnormal.  - Seeing gynecology for annuals  Attempting Pregnancy for : 6 months  Previous pregnancies for either partner: One prior pregnancy  - Will be [age] years old in November  - reports grade 2 varicocele    Previous Semen analysis / Labs:     Component      Latest Ref Rng 2024   Andrology Lab ID# [id number]    Semen Appearance Normal    Semen Viscosity Normal    Semen Liquefaction Normal    Debris (Semen) Yes    Clumps (Semen) Yes    Agglutination (Semen) No    Volume (Semen)      1.5 mL 6.1    Concentration(Semen)      15 mill/mL 53.33    Total Motility (Semen)      40 % 69    Prog. Motility (Semen)      32 % 65    Non Prog. Motility (Semen)      % 4    Total No of Sperm (Semen)      39 mill 325.33    Total No of Motile (Semen)      mill 223.67    % Normal (Semen)      4 % 4.0    % Head defects (Semen)      % 95.8    % Neck Midpiece (Semen)      % 41.3    % Tail defects (Semen)      % 12.3    % Ex Residual Cytoplasm (Semen)      % 0.5    Tot. No of Norm. Sperm (Semen)      mill 13.013    Tot. No of Norm. Motile Sperm (Semen)      mill 8.947    Abstinence (days)      2 - 7 days 6    Collected Completely Yes    Collection Location Center    Collection Method Masturbation    Received time 11:19 am    Analyzed Time 11:34 am      PREVIOUS RISK FACTORS  History of testicular exposure to chemicals, radiation, or toxins: None  History of high fever, epididymitis, orchitis, prostatitis, sexually transmitted diseases, or trauma to the testicles: None  History of a varicocele, testicular torsion, cryptorchidism, postpubertile mumps or a family history of infertility: None  Coital Frequency:   Coital Lubricants: yes, water based or coconut oil.  Problems with erections or ejaculation: None  Smoker? No  Previous  Testosterone or anabolic steroid Use: none    PRIOR Assisted Reproductive Technology:  None    Family history of breast cancer.  His cousins and close relatives required mastectomies.      Imaging:  MRI prostate, 6/28/24: personally reviewed and interpreted  IMPRESSION:  Diffuse non nodular hypointensities within the peripheral zone,  without evidence of focally restricted diffusion ( PI-RADS 2).  Findings may relate to changes of prostatitis in the appropriate  clinical setting.    Labs:  PSA trend:  7/16/24: 2.99  6/7/24: 3.03  4/15/24: 3.23    PMH:  Past Medical History:   Diagnosis Date    Dysuria 06/08/2023    Personal history of (healed) stress fracture     Personal history of stress fracture    Spondylolisthesis, site unspecified     Spondylolisthesis        PSH:  Past Surgical History:   Procedure Laterality Date    OTHER SURGICAL HISTORY  07/17/2013    Dental Surgery    TONSILLECTOMY  07/17/2013    Tonsillectomy        Medications:  No current outpatient medications on file.    Allergy:  Allergies   Allergen Reactions    Sulfa (Sulfonamide Antibiotics) Unknown        Exam  Testicles descended bilaterally, nontender, no masses  Vasa palpable bilaterally  Penis circ'd, no lesions, no plaques    Assessment/Plan  #infertility  I counseled the patient in detail regarding infertility, specifically the male component. We reviewed different causes of male infertility as well as options to optimize male fertility, to different surgical interventions and assisted reproductive technologies.    Recommended waiting until 1 year of trying to conceive before definitive diagnosis of infertility  Discussed grade 2 varicocele is not concerning for infertility given SA is normal - discussed r/b/a of varicocele repair, not indciated at this time.  Provided female fertility doctor info.    #prostate ca screening  Discussed FH of breast cancer and implications of BRCA gene (unclear if this is the gene mutation in his  family)  Patient will talk to Dr. Arteaga to confirm the type.    Fu as needed with me, will fu with Dr. Arteaga re: prostate ca screening    Scribe Attestation  By signing my name below, I, Kacy Ley,   attest that this documentation has been prepared under the direction and in the presence of Margarita Min MD.

## 2024-12-16 ENCOUNTER — APPOINTMENT (OUTPATIENT)
Dept: PRIMARY CARE | Facility: CLINIC | Age: 29
End: 2024-12-16
Payer: COMMERCIAL

## 2024-12-18 ENCOUNTER — APPOINTMENT (OUTPATIENT)
Dept: PRIMARY CARE | Facility: CLINIC | Age: 29
End: 2024-12-18
Payer: COMMERCIAL

## 2025-01-20 ENCOUNTER — APPOINTMENT (OUTPATIENT)
Dept: UROLOGY | Facility: HOSPITAL | Age: 30
End: 2025-01-20
Payer: COMMERCIAL

## 2025-02-03 ENCOUNTER — APPOINTMENT (OUTPATIENT)
Dept: UROLOGY | Facility: HOSPITAL | Age: 30
End: 2025-02-03
Payer: COMMERCIAL

## 2025-03-17 ENCOUNTER — APPOINTMENT (OUTPATIENT)
Dept: PRIMARY CARE | Facility: CLINIC | Age: 30
End: 2025-03-17
Payer: COMMERCIAL

## 2025-03-17 VITALS
HEIGHT: 73 IN | DIASTOLIC BLOOD PRESSURE: 74 MMHG | HEART RATE: 55 BPM | BODY MASS INDEX: 24.78 KG/M2 | OXYGEN SATURATION: 97 % | SYSTOLIC BLOOD PRESSURE: 124 MMHG | WEIGHT: 187 LBS

## 2025-03-17 DIAGNOSIS — Z13.220 LIPID SCREENING: ICD-10-CM

## 2025-03-17 DIAGNOSIS — Z00.00 WELLNESS EXAMINATION: ICD-10-CM

## 2025-03-17 DIAGNOSIS — B97.7 HPV IN MALE: Primary | ICD-10-CM

## 2025-03-17 DIAGNOSIS — Z12.5 PROSTATE CANCER SCREENING: ICD-10-CM

## 2025-03-17 PROCEDURE — 1036F TOBACCO NON-USER: CPT | Performed by: NURSE PRACTITIONER

## 2025-03-17 PROCEDURE — 3008F BODY MASS INDEX DOCD: CPT | Performed by: NURSE PRACTITIONER

## 2025-03-17 PROCEDURE — 99395 PREV VISIT EST AGE 18-39: CPT | Performed by: NURSE PRACTITIONER

## 2025-03-17 ASSESSMENT — PATIENT HEALTH QUESTIONNAIRE - PHQ9
SUM OF ALL RESPONSES TO PHQ9 QUESTIONS 1 AND 2: 0
2. FEELING DOWN, DEPRESSED OR HOPELESS: NOT AT ALL
1. LITTLE INTEREST OR PLEASURE IN DOING THINGS: NOT AT ALL

## 2025-03-17 NOTE — PROGRESS NOTES
"Subjective   Patient ID: Gopi Wang is a 29 y.o. male who presents for Annual Exam (Patient is here today for a physical, no complaints).    29 year old male here for annual CPE  No RX  OTC zyrtec  High stress job  HPV- sees uro, gets routine PSA which have been nml  Wife pregnant with 2nd baby girl due in July    Prevention:  Breast Ca screen: aunt and cousin breast cancer, +BRCA   Colon Ca Screen: no fam HX  Lung Ca Screen: no fam HX. No vape/smoke  Prostate Ca Screen: uro routine OV, PSA regular updates  CT Cardiac Score:  paternal CV disease- MI  Diabetes Screen: no fam HX  Opthal: q yr- contacts/glasses  Dental: q 6 months. Cavities.   Exercise: just restarted. Running 3-4 mi 18 days a month  Diet: caffeine- rare soda. Coffee- 4 C a day. ETOH- decreased  to maybe 3 a week. Meals- 2/day well balanced- minimal fruit            Review of Systems    Objective   /74   Pulse 55   Ht 1.854 m (6' 1\")   Wt 84.8 kg (187 lb)   SpO2 97%   BMI 24.67 kg/m²     Physical Exam  Vitals reviewed.   Constitutional:       General: He is not in acute distress.     Appearance: Normal appearance.   HENT:      Head: Normocephalic and atraumatic.   Eyes:      Extraocular Movements: Extraocular movements intact.      Conjunctiva/sclera: Conjunctivae normal.      Pupils: Pupils are equal, round, and reactive to light.   Neck:      Vascular: No carotid bruit.   Cardiovascular:      Rate and Rhythm: Normal rate and regular rhythm.      Pulses: Normal pulses.      Heart sounds: Normal heart sounds. No murmur heard.  Pulmonary:      Effort: Pulmonary effort is normal.      Breath sounds: Normal breath sounds.   Abdominal:      General: Bowel sounds are normal. There is no distension.      Palpations: Abdomen is soft.      Tenderness: There is no abdominal tenderness.   Musculoskeletal:         General: Normal range of motion.      Cervical back: Normal range of motion and neck supple.   Lymphadenopathy:      Cervical: No cervical " adenopathy.   Skin:     General: Skin is warm and dry.   Neurological:      General: No focal deficit present.      Mental Status: He is alert and oriented to person, place, and time. Mental status is at baseline.   Psychiatric:         Mood and Affect: Mood normal.         Behavior: Behavior normal.         Thought Content: Thought content normal.         Assessment/Plan   Diagnoses and all orders for this visit:  HPV in male  -     PSA, Total and Free; Future  Lipid screening  -     Lipid Panel; Future  Prostate cancer screening  -     PSA, Total and Free; Future  Wellness examination  -     Comprehensive Metabolic Panel; Future  -     CBC and Auto Differential; Future  -     Lipid Panel; Future  -     PSA, Total and Free; Future  Other orders  -     Follow Up In Primary Care - Other

## 2025-03-29 LAB
ALBUMIN SERPL-MCNC: 4.5 G/DL (ref 3.6–5.1)
ALP SERPL-CCNC: 45 U/L (ref 36–130)
ALT SERPL-CCNC: 21 U/L (ref 9–46)
ANION GAP SERPL CALCULATED.4IONS-SCNC: 6 MMOL/L (CALC) (ref 7–17)
AST SERPL-CCNC: 19 U/L (ref 10–40)
BASOPHILS # BLD AUTO: 61 CELLS/UL (ref 0–200)
BASOPHILS NFR BLD AUTO: 1.2 %
BILIRUB SERPL-MCNC: 0.5 MG/DL (ref 0.2–1.2)
BUN SERPL-MCNC: 10 MG/DL (ref 7–25)
CALCIUM SERPL-MCNC: 9.6 MG/DL (ref 8.6–10.3)
CHLORIDE SERPL-SCNC: 104 MMOL/L (ref 98–110)
CHOLEST SERPL-MCNC: 135 MG/DL
CHOLEST/HDLC SERPL: 2.8 (CALC)
CO2 SERPL-SCNC: 30 MMOL/L (ref 20–32)
CREAT SERPL-MCNC: 0.92 MG/DL (ref 0.6–1.26)
EGFRCR SERPLBLD CKD-EPI 2021: 114 ML/MIN/1.73M2
EOSINOPHIL # BLD AUTO: 352 CELLS/UL (ref 15–500)
EOSINOPHIL NFR BLD AUTO: 6.9 %
ERYTHROCYTE [DISTWIDTH] IN BLOOD BY AUTOMATED COUNT: 12.3 % (ref 11–15)
GLUCOSE SERPL-MCNC: 88 MG/DL (ref 65–139)
HCT VFR BLD AUTO: 45.2 % (ref 38.5–50)
HDLC SERPL-MCNC: 48 MG/DL
HGB BLD-MCNC: 15.3 G/DL (ref 13.2–17.1)
LDLC SERPL CALC-MCNC: 73 MG/DL (CALC)
LYMPHOCYTES # BLD AUTO: 1673 CELLS/UL (ref 850–3900)
LYMPHOCYTES NFR BLD AUTO: 32.8 %
MCH RBC QN AUTO: 31.1 PG (ref 27–33)
MCHC RBC AUTO-ENTMCNC: 33.8 G/DL (ref 32–36)
MCV RBC AUTO: 91.9 FL (ref 80–100)
MONOCYTES # BLD AUTO: 372 CELLS/UL (ref 200–950)
MONOCYTES NFR BLD AUTO: 7.3 %
NEUTROPHILS # BLD AUTO: 2642 CELLS/UL (ref 1500–7800)
NEUTROPHILS NFR BLD AUTO: 51.8 %
NONHDLC SERPL-MCNC: 87 MG/DL (CALC)
PLATELET # BLD AUTO: 152 THOUSAND/UL (ref 140–400)
PMV BLD REES-ECKER: 12.5 FL (ref 7.5–12.5)
POTASSIUM SERPL-SCNC: 4.2 MMOL/L (ref 3.5–5.3)
PROT SERPL-MCNC: 7 G/DL (ref 6.1–8.1)
PSA FREE MFR SERPL: NORMAL %
PSA FREE SERPL-MCNC: NORMAL NG/ML
PSA SERPL-MCNC: NORMAL NG/ML
RBC # BLD AUTO: 4.92 MILLION/UL (ref 4.2–5.8)
SODIUM SERPL-SCNC: 140 MMOL/L (ref 135–146)
TRIGL SERPL-MCNC: 68 MG/DL
WBC # BLD AUTO: 5.1 THOUSAND/UL (ref 3.8–10.8)

## 2025-03-31 LAB
ALBUMIN SERPL-MCNC: 4.5 G/DL (ref 3.6–5.1)
ALP SERPL-CCNC: 45 U/L (ref 36–130)
ALT SERPL-CCNC: 21 U/L (ref 9–46)
ANION GAP SERPL CALCULATED.4IONS-SCNC: 6 MMOL/L (CALC) (ref 7–17)
AST SERPL-CCNC: 19 U/L (ref 10–40)
BASOPHILS # BLD AUTO: 61 CELLS/UL (ref 0–200)
BASOPHILS NFR BLD AUTO: 1.2 %
BILIRUB SERPL-MCNC: 0.5 MG/DL (ref 0.2–1.2)
BUN SERPL-MCNC: 10 MG/DL (ref 7–25)
CALCIUM SERPL-MCNC: 9.6 MG/DL (ref 8.6–10.3)
CHLORIDE SERPL-SCNC: 104 MMOL/L (ref 98–110)
CHOLEST SERPL-MCNC: 135 MG/DL
CHOLEST/HDLC SERPL: 2.8 (CALC)
CO2 SERPL-SCNC: 30 MMOL/L (ref 20–32)
CREAT SERPL-MCNC: 0.92 MG/DL (ref 0.6–1.26)
EGFRCR SERPLBLD CKD-EPI 2021: 114 ML/MIN/1.73M2
EOSINOPHIL # BLD AUTO: 352 CELLS/UL (ref 15–500)
EOSINOPHIL NFR BLD AUTO: 6.9 %
ERYTHROCYTE [DISTWIDTH] IN BLOOD BY AUTOMATED COUNT: 12.3 % (ref 11–15)
GLUCOSE SERPL-MCNC: 88 MG/DL (ref 65–139)
HCT VFR BLD AUTO: 45.2 % (ref 38.5–50)
HDLC SERPL-MCNC: 48 MG/DL
HGB BLD-MCNC: 15.3 G/DL (ref 13.2–17.1)
LDLC SERPL CALC-MCNC: 73 MG/DL (CALC)
LYMPHOCYTES # BLD AUTO: 1673 CELLS/UL (ref 850–3900)
LYMPHOCYTES NFR BLD AUTO: 32.8 %
MCH RBC QN AUTO: 31.1 PG (ref 27–33)
MCHC RBC AUTO-ENTMCNC: 33.8 G/DL (ref 32–36)
MCV RBC AUTO: 91.9 FL (ref 80–100)
MONOCYTES # BLD AUTO: 372 CELLS/UL (ref 200–950)
MONOCYTES NFR BLD AUTO: 7.3 %
NEUTROPHILS # BLD AUTO: 2642 CELLS/UL (ref 1500–7800)
NEUTROPHILS NFR BLD AUTO: 51.8 %
NONHDLC SERPL-MCNC: 87 MG/DL (CALC)
PLATELET # BLD AUTO: 152 THOUSAND/UL (ref 140–400)
PMV BLD REES-ECKER: 12.5 FL (ref 7.5–12.5)
POTASSIUM SERPL-SCNC: 4.2 MMOL/L (ref 3.5–5.3)
PROT SERPL-MCNC: 7 G/DL (ref 6.1–8.1)
PSA FREE MFR SERPL: 19 % (CALC)
PSA FREE SERPL-MCNC: 0.8 NG/ML
PSA SERPL-MCNC: 4.3 NG/ML
RBC # BLD AUTO: 4.92 MILLION/UL (ref 4.2–5.8)
SODIUM SERPL-SCNC: 140 MMOL/L (ref 135–146)
TRIGL SERPL-MCNC: 68 MG/DL
WBC # BLD AUTO: 5.1 THOUSAND/UL (ref 3.8–10.8)

## 2025-04-02 ENCOUNTER — TELEMEDICINE (OUTPATIENT)
Dept: UROLOGY | Facility: HOSPITAL | Age: 30
End: 2025-04-02
Payer: COMMERCIAL

## 2025-04-02 DIAGNOSIS — R97.20 ELEVATED PSA: ICD-10-CM

## 2025-04-02 DIAGNOSIS — Z84.81 FAMILY HISTORY OF BRCA GENE MUTATION: Primary | ICD-10-CM

## 2025-04-02 PROCEDURE — 99213 OFFICE O/P EST LOW 20 MIN: CPT | Performed by: STUDENT IN AN ORGANIZED HEALTH CARE EDUCATION/TRAINING PROGRAM

## 2025-04-02 NOTE — PROGRESS NOTES
UROLOGIC FOLLOW VISIT UP     PROBLEM LIST:  1. Elevated PSA  PSA    PSA    Follow Up In Urology             HISTORY OF PRESENT ILLNESS:   Gopi Wang is a 29 y.o. male who was referred by Dr. Min for PSA management. PSA 6/7/2024 3.03, 4/15/2023 3.23. MR prostate completed 6/28/2024 revealed Diffuse non nodular hypointensities within the peripheral zone, without evidence of focally restricted diffusion ( PI-RADS 2). Findings may relate to changes of prostatitis in the appropriate clinical setting.Denies family history of cancer. He does have complaints of dysuria that has been ongoing for the last 2 years. Rates dysuria 4/10. He denies any hematuria or other lower urinary tract symptoms.     Interim history:  4/12/25:This is a telehealth visit. He reports doing overall well. PSA on 3/28/25 4.3, 7/16/24 2.99. notes stable urinary symptoms, however notes minimal burning at the start of urinary stream, which fades away later. Good stream. He remains active on daily basis. He denies any stressors. He denies any fevers, chills, nausea, vomiting.     Off note, he reports a family history of BRCA gene      PAST MEDICAL HISTORY:  Past Medical History:   Diagnosis Date    Dysuria 06/08/2023    Personal history of (healed) stress fracture     Personal history of stress fracture    Spondylolisthesis, site unspecified     Spondylolisthesis       PAST SURGICAL HISTORY:  Past Surgical History:   Procedure Laterality Date    OTHER SURGICAL HISTORY  07/17/2013    Dental Surgery    TONSILLECTOMY  07/17/2013    Tonsillectomy        ALLERGIES:   Allergies   Allergen Reactions    Sulfa (Sulfonamide Antibiotics) Unknown        MEDICATIONS:   No current outpatient medications on file.      SOCIAL HISTORY:  Patient  reports that he has never smoked. He has never been exposed to tobacco smoke. He has never used smokeless tobacco. He reports current alcohol use. He reports that he does not use drugs.   Social History      Socioeconomic History    Marital status:      Spouse name: Not on file    Number of children: Not on file    Years of education: Not on file    Highest education level: Not on file   Occupational History    Not on file   Tobacco Use    Smoking status: Never     Passive exposure: Never    Smokeless tobacco: Never   Vaping Use    Vaping status: Never Used   Substance and Sexual Activity    Alcohol use: Yes     Comment: social    Drug use: Never    Sexual activity: Not on file   Other Topics Concern    Not on file   Social History Narrative    Not on file     Social Drivers of Health     Financial Resource Strain: Not on file   Food Insecurity: Not on file   Transportation Needs: Not on file   Physical Activity: Not on file   Stress: Not on file   Social Connections: Not on file   Intimate Partner Violence: Not on file   Housing Stability: Not on file       FAMILY HISTORY:  No family history on file.    REVIEW OF SYSTEMS:   Constitutional: Negative for fever and chills. Denies anorexia, weight loss.  Eyes: Negative for visual disturbance.   Respiratory: Negative for shortness of breath.    Cardiovascular: Negative for chest pain.   Gastrointestinal: Negative for nausea and vomiting.   Genitourinary: See interval history above.  Skin: Negative for rash.   Neurological: Negative for dizziness and numbness.   Psychiatric/Behavioral: Negative for confusion and decreased concentration.     PHYSICAL EXAM:  There were no vitals taken for this visit.  Constitutional: Patient appears well-developed and well-nourished. No distress.    Head: Normocephalic and atraumatic.    Neck: Normal range of motion.    Cardiovascular: Normal rate.    Pulmonary/Chest: Effort normal. No respiratory distress.   Abdominal: Soft nontender nondistended  Musculoskeletal: Normal range of motion.    Neurological: Alert and oriented to person, place, and time.  Psychiatric: Normal mood and affect. Behavior is normal. Thought content normal.         LABORATORY REVIEW:     Lab Results   Component Value Date    BUN 10 03/28/2025    CREATININE 0.92 03/28/2025    EGFR 114 03/28/2025     03/28/2025    K 4.2 03/28/2025     03/28/2025    CO2 30 03/28/2025    CALCIUM 9.6 03/28/2025      Lab Results   Component Value Date    WBC 5.1 03/28/2025    RBC 4.92 03/28/2025    HGB 15.3 03/28/2025    HCT 45.2 03/28/2025    MCV 91.9 03/28/2025    MCH 31.1 03/28/2025    MCHC 33.8 03/28/2025    RDW 12.3 03/28/2025     03/28/2025    MPV 12.5 03/28/2025        Lab Results   Component Value Date    PSA 4.3 (H) 03/28/2025    PSA 2.99 07/16/2024    PSA 3.03 06/07/2024    PSA 3.23 04/15/2024         Assessment:      1. Elevated PSA  PSA    PSA    Follow Up In Urology             Plan:   Reviewed and interpreted patient's PSA trend, which appears elevated at 4.3 on 3/28/25 from 2.99 on 7/16/24  I explained to him that minor fluctuation are normal, however, we want to ensure there is no gradual upward trend   Counseled that because his PSA is elevated for 28-year-old male I would recommend closer surveillance  I explained if need if can consider doing a prostate biopsy if there is no improvement in his PSA trend.  Has a family history of BRCA gene, we will place a referral to genetics  Will see patient back in 6 months to recheck his PSA  All questions were answered to patient's satisfaction     26 minutes total spent on patient's care today; >50% time spent on counseling/coordination of care      Scribe Attestation  By signing my name below, Xi HENRY Scribe   attest that this documentation has been prepared under the direction and in the presence of Raman Arteaga MD.

## 2025-04-07 ENCOUNTER — APPOINTMENT (OUTPATIENT)
Dept: UROLOGY | Facility: HOSPITAL | Age: 30
End: 2025-04-07
Payer: COMMERCIAL

## 2025-07-08 ENCOUNTER — TELEPHONE (OUTPATIENT)
Dept: GENETICS | Facility: CLINIC | Age: 30
End: 2025-07-08
Payer: COMMERCIAL

## 2025-07-08 NOTE — PROGRESS NOTES
"Cancer Genetic Counseling - Initial Evaluation    Patient name:  Gopi Wang  :   1995  MRN:  [id number]     Referred by:  Raman Arteaga MD    HISTORY OF PRESENT ILLNESS:  Gopi Wang is a 29 y.o. male referred for a family history of a breast cancer and to discuss genetic testing options.  He reports he was told that the breast cancer in the family is due to a BRCA mutation but no one in the family had genetic testing done to confirm this.       Cancer medical history:  Gopi does not have a personal history of cancer.      Additional relevant personal medical history:   He is following with Urology due to elevated PSA levels. PSA 3/28/25 4.3, PSA 2024 3.03, 4/15/2023 3.23. MR prostate completed 2024 revealed Diffuse non nodular hypointensities within the peripheral zone, without evidence of focally restricted diffusion ( PI-RADS 2). It was recommended he be followed closely with surveillance at this time.       Previous genetic testing for hereditary cancer risk? No    Cancer screening history:  Colonoscopy: No  Upper endoscopies: No  Prostate cancer screening: Yes, as noted above  Dermatology: Yes, he tries to go regularly for skin checks  Other: No    FAMILY HISTORY:  A 4-generation pedigree was obtained, and the significant findings are noted below.  Family history (pedigree) will be scanned into Epic.  - Paternal aunt, 55 years old, breast cancer diagnosed in her 30s     Maternal ancestry is [place].  Paternal ancestry is Arabic. There is no known Ashkenazi Spiritism ancestry.  Consanguinity was denied.       COUNSELING:  Most cancers are not due to an inherited genetic susceptibility. However, about 5-10% of cancer is due to a hereditary cause. Those with a genetic predisposition to cancer are born with a gene change (pathogenic variant) that makes them more susceptible to developing certain forms of cancer. Within families with a genetic predisposition to cancer, we often see \"red flags\", such " as cancers diagnosed at earlier ages than typical, multiple family members with the same cancer, multiple primary cancers in the same individual, and cancer occurring in multiple generations. Seeing a certain combination of cancers within the same family may also raise suspicion for an underlying genetic predisposition.     Gopi was counseled about hereditary cancer susceptibility including cancer risks and genetic testing. The option of genetic testing via a hereditary cancer panel was presented. We specifically discussed the 40-gene CancerNext +RNAinsight Panel through [facility]. This test analyzes a number of genes associated with hereditary cancer. Each gene on the panel is associated with an increased risk for certain types of cancer, and some genes increase the risk for cancer to a greater extent than others. Gopi was informed of the possible results of genetic testing (positive, negative, and variant(s) of uncertain significance).  The purpose of testing and potential implications for Gopi and his family were discussed.      We reviewed that it is most informative, if possible, to test a cancer-affected individual when trying to determine the cause of the cancer in the family.  We discussed that if this is not feasible, for any reason, then it is very appropriate to test cancer-unaffected family members.  We discussed that in Gopi's family, the best person to test would be his paternal aunt who had early-onset breast cancer.  If other relatives complete genetic testing, their results should be compared to Gopi's for further risk assessment and interpretation. He expressed his understanding.  In the meantime, genetic testing was offered to [facility] directly, keeping in mind the limitations of initiating testing with an unaffected individual.      We discussed the Genetic Information Nondiscrimination Act of 2008 (RUBIO), which is a federal law that protects people from genetic discrimination in health  insurance and employment. There are some exceptions to RUBIO. RUBIO does not apply to employers with fewer than 15 employees. Additionally, the protections of RUBIO do not apply to federal employees enrolled in the Federal Employees Health Benefits program, members of the [place]  who receive their care through [employer], and veterans who receive their care through the VA; however, these groups have policies in place that provide protections similar to RUBIO. We also discussed that RUBOI does not apply to life, disability, and long-term care insurance.    IMPRESSION:  Gopi meets NCCN criteria for genetic testing for high-penetrance breast cancer susceptibility genes  based on the family history of early-onset breast cancer (diagnosed less than age 50) in his paternal aunt.     -Genetic testing ordered: [facility] CancerNext +Risk Identns[facility]era panel, which analyzes 40 genes associated with hereditary cancer (APC, PEPITO, AXIN2, BAP1, BARD1, BMPR1A, BRCA1, BRCA2, BRIP1, CDH1, CDKN2A, CHEK2, EPCAM, FH, FLCN, GREM1, HOXB13, MBD4, MET, MLH1, MSH2, MSH3, MSH6, MUTYH, NF1, NTHL1, PALB2, PMS2, POLD1, POLE, PTEN, RAD51C, RAD51D, RPS20, SMAD4, STK11, TP53, TSC1, TSC2, VHL).    -Screening recommendations: If the results are positive for a pathogenic (disease-causing) variant, then we would discuss relevant options for cancer risk management (such as extra cancer screenings or risk-reducing options) according to current practice guidelines. If genetic testing is negative or not completed, it is recommended that Gopi proceed with cancer screenings as per his personal medical history and family history of cancer.       PLAN:  - Gopi elected to proceed with genetic testing via the 40-gene CancerNext +RNAinsight Panel panel through [facility].  - Verbal consent for testing was obtained.    - [facility]'s insurance billing process was explained.   - Gopi elected to proceed with testing via a blood draw.  - The patient had his blood drawn today  following the appointment.    - The sample will be sent to [facility] for analysis.  Results are typically expected within 3-4 weeks.  - Gopi will return for a follow up visit to discuss his test results, once available.  Further recommendations for Gopi and his family members will be made accordingly at that time.      Please contact us with any additional questions or concerns.      Elizabeth Leigh MS  Licensed Genetic Counselor  [facility]  [phone]      Total time spent on day of encounter: 43 minutes (28 minutes with patient, 15 minutes on pre/post patient care activities, including documentation).

## 2025-07-08 NOTE — TELEPHONE ENCOUNTER
"Patient called back to return earlier voicemail regarding his upcoming appointment with RENUKA Varma. Patient confirmed that he had been told there was a \"family history of BRCA,\" but no members of the patient's immediate family have gotten genetic testing to his knowledge.  "

## 2025-07-09 ENCOUNTER — CLINICAL SUPPORT (OUTPATIENT)
Dept: GENETICS | Facility: CLINIC | Age: 30
End: 2025-07-09
Payer: COMMERCIAL

## 2025-07-09 ENCOUNTER — TELEPHONE (OUTPATIENT)
Dept: GENETICS | Facility: CLINIC | Age: 30
End: 2025-07-09

## 2025-07-09 VITALS
RESPIRATION RATE: 18 BRPM | DIASTOLIC BLOOD PRESSURE: 64 MMHG | HEART RATE: 65 BPM | SYSTOLIC BLOOD PRESSURE: 134 MMHG | WEIGHT: 180.2 LBS | BODY MASS INDEX: 23.77 KG/M2 | TEMPERATURE: 97.2 F

## 2025-07-09 DIAGNOSIS — Z71.83 ENCOUNTER FOR NONPROCREATIVE GENETIC COUNSELING: Primary | ICD-10-CM

## 2025-07-09 DIAGNOSIS — Z84.81 FAMILY HISTORY OF BRCA GENE MUTATION: ICD-10-CM

## 2025-07-09 DIAGNOSIS — Z80.3 FAMILY HISTORY OF BREAST CANCER: ICD-10-CM

## 2025-07-09 PROCEDURE — 36415 COLL VENOUS BLD VENIPUNCTURE: CPT | Performed by: GENETIC COUNSELOR, MS

## 2025-07-09 ASSESSMENT — ENCOUNTER SYMPTOMS
DEPRESSION: 0
LOSS OF SENSATION IN FEET: 0
OCCASIONAL FEELINGS OF UNSTEADINESS: 0

## 2025-07-09 ASSESSMENT — PATIENT HEALTH QUESTIONNAIRE - PHQ9
2. FEELING DOWN, DEPRESSED OR HOPELESS: NOT AT ALL
SUM OF ALL RESPONSES TO PHQ9 QUESTIONS 1 AND 2: 0
1. LITTLE INTEREST OR PLEASURE IN DOING THINGS: NOT AT ALL

## 2025-07-09 ASSESSMENT — PAIN SCALES - GENERAL: PAINLEVEL_OUTOF10: 0-NO PAIN

## 2025-07-10 ENCOUNTER — TELEPHONE (OUTPATIENT)
Dept: GENETICS | Facility: CLINIC | Age: 30
End: 2025-07-10
Payer: COMMERCIAL

## 2025-07-10 NOTE — TELEPHONE ENCOUNTER
Gopi called me today and stated he wants to cancel the genetic testing to [facility].  He had his blood drawn yesterday.  A message was sent to [facility] to cancel the order and discard his samples (if received) and a message was sent to the Sendout group at the  Genetics lab to not send his samples out to [facility] if they receive them.     Gopi stated he wants more time to think about whether he wants to proceed with the genetic testing.  We are planning to touch base in early August and he will let me know if he has any questions.      Elizabeth Leigh, MS  Licensed Genetic Counselor  [facility]  [phone]

## 2025-08-06 ENCOUNTER — APPOINTMENT (OUTPATIENT)
Dept: GENETICS | Facility: CLINIC | Age: 30
End: 2025-08-06
Payer: COMMERCIAL

## 2025-08-18 ENCOUNTER — APPOINTMENT (OUTPATIENT)
Dept: UROLOGY | Facility: HOSPITAL | Age: 30
End: 2025-08-18
Payer: COMMERCIAL

## 2025-08-18 DIAGNOSIS — Z30.09 VASECTOMY EVALUATION: Primary | ICD-10-CM

## 2025-08-18 PROCEDURE — 99212 OFFICE O/P EST SF 10 MIN: CPT

## 2025-08-18 PROCEDURE — 99213 OFFICE O/P EST LOW 20 MIN: CPT | Performed by: UROLOGY

## 2026-03-23 ENCOUNTER — APPOINTMENT (OUTPATIENT)
Dept: PRIMARY CARE | Facility: CLINIC | Age: 31
End: 2026-03-23
Payer: COMMERCIAL